# Patient Record
Sex: FEMALE | Race: AMERICAN INDIAN OR ALASKA NATIVE
[De-identification: names, ages, dates, MRNs, and addresses within clinical notes are randomized per-mention and may not be internally consistent; named-entity substitution may affect disease eponyms.]

---

## 2018-06-30 NOTE — EMERGENCY DEPARTMENT REPORT
ED Pregnancy HPI





- General


Chief complaint: Vaginal Bleeding


Stated complaint: 11 WKS PREG VAGINAL BLEEDING


Time Seen by Provider: 18 12:43


Source: patient


Mode of arrival: Ambulatory


Limitations: No Limitations





- History of Present Illness


Initial comments: 


27-year-old female  presents with complaint of 2 days of vaginal bleeding 

and crampy lower abdominal pain.  Denies discharge hematuria or dysuria denies 

any fevers or chills.  Patient is awake alert and oriented 3 not in acute 

distress accompanied by significant other at bedside.  Last menstrual period 

2018.


MD Complaint: vaginal bleeding





- Related Data


 Previous Rx's











 Medication  Instructions  Recorded  Last Taken  Type


 


Prednisone [Prednisone 10 mg 10 mg PO .TAPER #1 tab.ds.pk 03/21/15 Unknown Rx





(6-Day Pack, 21 Tabs)]    


 


traMADol [Ultram 50 MG tab] 50 mg PO Q6HR PRN #14 tablet 03/21/15 Unknown Rx


 


Ondansetron [Zofran Odt] 4 mg PO Q8HR PRN #8 tab.rapdis 05/19/15 Unknown Rx


 


Prenatal Vit-Fe Fumar-FA [Prenatal 1 each PO QDAY #30 tablet 05/19/15 Unknown Rx





Vitamin]    


 


Acetaminophen [Acetaminophen TAB] 500 mg PO Q6HR PRN #20 tablet 18 

Unknown Rx


 


Acetaminophen/Codeine [Tylenol 1 tab PO Q6H PRN #8 tab 18 Unknown Rx





/Codeine # 3 tab]    


 


Nitrofurantoin Monohyd/M-Cryst 100 mg PO BID #14 capsule 18 Unknown Rx





[Macrobid 100 mg Capsule]    











 Allergies











Allergy/AdvReac Type Severity Reaction Status Date / Time


 


Penicillins Allergy  Unknown Verified 03/21/15 18:14


 


aloe vera AdvReac  Hives Verified 05/19/15 11:34














ED Review of Systems


ROS: 


Stated complaint: 11 WKS PREG VAGINAL BLEEDING


Other details as noted in HPI





Constitutional: denies: chills, fever


Eyes: denies: eye pain, eye discharge, vision change


ENT: denies: ear pain, throat pain


Respiratory: denies: cough, shortness of breath, wheezing


Cardiovascular: denies: chest pain, palpitations


Endocrine: no symptoms reported


Gastrointestinal: denies: abdominal pain, nausea, diarrhea


Genitourinary: denies: urgency, dysuria, discharge


Musculoskeletal: denies: back pain, joint swelling, arthralgia


Skin: denies: rash, lesions


Neurological: denies: headache, weakness, paresthesias


Psychiatric: denies: anxiety, depression


Hematological/Lymphatic: denies: easy bleeding, easy bruising





ED Past Medical Hx





- Surgical History


Additional Surgical History: C section





- Social History


Smoking Status: Never Smoker


Substance Use Type: None





- Medications


Home Medications: 


 Home Medications











 Medication  Instructions  Recorded  Confirmed  Last Taken  Type


 


Prednisone [Prednisone 10 mg 10 mg PO .TAPER #1 tab.ds.pk 03/21/15  Unknown Rx





(6-Day Pack, 21 Tabs)]     


 


traMADol [Ultram 50 MG tab] 50 mg PO Q6HR PRN #14 tablet 03/21/15  Unknown Rx


 


Ondansetron [Zofran Odt] 4 mg PO Q8HR PRN #8 tab.rapdis 05/19/15  Unknown Rx


 


Prenatal Vit-Fe Fumar-FA [Prenatal 1 each PO QDAY #30 tablet 05/19/15  Unknown 

Rx





Vitamin]     


 


Acetaminophen [Acetaminophen TAB] 500 mg PO Q6HR PRN #20 tablet 18  

Unknown Rx


 


Acetaminophen/Codeine [Tylenol 1 tab PO Q6H PRN #8 tab 18  Unknown Rx





/Codeine # 3 tab]     


 


Nitrofurantoin Monohyd/M-Cryst 100 mg PO BID #14 capsule 18  Unknown Rx





[Macrobid 100 mg Capsule]     














ED Physical Exam





- General


Limitations: No Limitations


General appearance: alert, in no apparent distress





- Head


Head exam: Present: atraumatic, normocephalic





- Eye


Eye exam: Present: normal appearance





- ENT


ENT exam: Present: mucous membranes moist





- Neck


Neck exam: Present: normal inspection





- Respiratory


Respiratory exam: Present: normal lung sounds bilaterally.  Absent: respiratory 

distress





- Cardiovascular


Cardiovascular Exam: Present: regular rate, normal rhythm.  Absent: systolic 

murmur, diastolic murmur, rubs, gallop





- GI/Abdominal


GI/Abdominal exam: Present: soft, normal bowel sounds





- 


Speculum exam: Present: vaginal bleeding





- Extremities Exam


Extremities exam: Present: normal inspection





- Back Exam


Back exam: Present: normal inspection





- Neurological Exam


Neurological exam: Present: alert, oriented X3, CN II-XII intact, normal gait





- Psychiatric


Psychiatric exam: Present: normal affect, normal mood





- Skin


Skin exam: Present: warm, dry, intact, normal color.  Absent: rash





ED Course


 Vital Signs











  18





  11:03


 


Temperature 98.4 F


 


Pulse Rate 103 H


 


Blood Pressure 132/83


 


O2 Sat by Pulse 99





Oximetry 














ED Medical Decision Making





- Lab Data


Result diagrams: 


 18 12:02








- Medical Decision Making


A/P: miscarriage


1- ultrasound consistent with active miscarriage


2- discussed with Dr. James before discharge, patient states she has a OB/GYN 

called Dr. Lucita Metcalf at [facility]


3- RH+


4- I advised patient to follow up with her OB/GYN in 2-4 days for repeat hCG 

level and further workup as needed.  Urinalysis is unremarkable otherwise. 


Critical care attestation.: 


If time is entered above; I have spent that time in minutes in the direct care 

of this critically ill patient, excluding procedure time.








ED Disposition


Clinical Impression: 


 Miscarriage





Disposition: DC-01 TO HOME OR SELFCARE


Is pt being admited?: No


Does the pt Need Aspirin: No


Condition: Stable


Instructions:  Spontaneous Miscarriage (ED)


Additional Instructions: 


I advised patient to follow-up with her OB/GYN in 4-5 days for repeat blood 

testing hormone level and possible ultrasound


Prescriptions: 


Acetaminophen [Acetaminophen TAB] 500 mg PO Q6HR PRN #20 tablet


 PRN Reason: Pain , Severe (7-10)


Acetaminophen/Codeine [Tylenol /Codeine # 3 tab] 1 tab PO Q6H PRN #8 tab


 PRN Reason: Pain , Severe (7-10)


Nitrofurantoin Monohyd/M-Cryst [Macrobid 100 mg Capsule] 100 mg PO BID #14 

capsule


Referrals: 


BENJA MEDINA MD [Staff Physician] - 3-5 Days


Forms:  Accompanied Note, Work/School Release Form(ED)


Time of Disposition: 14:49

## 2018-06-30 NOTE — EMERGENCY DEPARTMENT REPORT
Blank Doc





- Documentation


Documentation: 


 27-year-old female  approximately 11 weeks pregnant by last menstrual 

period that presents with bilateral lower abdominal cramping and vaginal 

bleeding for the past 2 days.  Patient says that her bleeding is worsening.  

She has seen several clots passed.  She is concerned that she could be having a 

miscarriage.  Patient has not had an ultrasound to confirm her dates.  She has 

never had a miscarriage before.  Denies urinary symptoms.  Previous 4 

pregnancies were complicated.

## 2018-06-30 NOTE — ULTRASOUND REPORT
TRANSABDOMINAL AND TRANSVAGINAL OBSTETRICAL ULTRASOUND: 06/30/18 

10:48:00



CLINICAL: Positive pregnancy test and vaginal bleeding.







FINDINGS: Transabdominal and transvaginal pelvic ultrasound 

demonstrated a large amount of echogenic material which is presumably 

blood in the lower uterine cavity extending into the cervix and an 

empty gestational sac in the lower uterine segment.  No yolk sac or 

embryo identified.  The cervix is closed.  The right ovary is normal 

and was only identified transabdominally.  It measures 2.8 x 1.4 x 

2.7cm.  The left ovary measures 3.1 x 2.1 x 2.2 cm and contains a 

complex cyst measuring 1.3cm. 



No adnexal mass.  No free fluid. Normal urinary bladder.



IMPRESSION: An abnormal intrauterine gestational sac and its uterine 

blood consistent with an intrauterine AB in progress.  No suspicion of 

ectopic pregnancy.

## 2019-06-19 ENCOUNTER — HOSPITAL ENCOUNTER (EMERGENCY)
Dept: HOSPITAL 5 - ED | Age: 29
Discharge: HOME | End: 2019-06-19
Payer: MEDICAID

## 2019-06-19 VITALS — DIASTOLIC BLOOD PRESSURE: 70 MMHG | SYSTOLIC BLOOD PRESSURE: 119 MMHG

## 2019-06-19 DIAGNOSIS — R50.9: ICD-10-CM

## 2019-06-19 DIAGNOSIS — Z91.09: ICD-10-CM

## 2019-06-19 DIAGNOSIS — R19.7: ICD-10-CM

## 2019-06-19 DIAGNOSIS — O26.891: Primary | ICD-10-CM

## 2019-06-19 DIAGNOSIS — Z88.0: ICD-10-CM

## 2019-06-19 DIAGNOSIS — R51: ICD-10-CM

## 2019-06-19 PROCEDURE — 99281 EMR DPT VST MAYX REQ PHY/QHP: CPT

## 2019-06-19 NOTE — EMERGENCY DEPARTMENT REPORT
ED Fever HPI





- General


Chief Complaint: Fever


Stated Complaint: FLU SYMPTOMS


Time Seen by Provider: 19 09:25


Source: patient





- History of Present Illness


Initial Comments: 





Ms. Nelson is a 27 yo  who is approximately 6-7 weeks pregnant.  She took a

home pregnancy test which was positive.  She does not have any abdominal pain or

vomiting.  She does have headache diarrhea fever and chills for the past few 

days.  Fever at home was 10 4F.  She denies vaginal bleeding.  She feels better

now.  However she wanted to make sure that she was not a risk for miscarriage.


Timing/Duration: other (3 days)


Fever Severity/Quality: greater than 102 F





ED Review of Systems


ROS: 


Stated complaint: FLU SYMPTOMS


Other details as noted in HPI





Comment: All other systems reviewed and negative


Constitutional: chills, fever


Gastrointestinal: nausea


Neurological: headache





ED Past Medical Hx





- Past Medical History


Previous Medical History?: No





- Surgical History


Past Surgical History?: Yes


Additional Surgical History: C section





- Social History


Smoking Status: Never Smoker





- Medications


Home Medications: 


                                Home Medications











 Medication  Instructions  Recorded  Confirmed  Last Taken  Type


 


Prednisone [Prednisone 10 mg 10 mg PO .TAPER #1 tab.ds.pk 03/21/15  Unknown Rx





(6-Day Pack, 21 Tabs)]     


 


traMADol [Ultram 50 MG tab] 50 mg PO Q6HR PRN #14 tablet 03/21/15  Unknown Rx


 


Ondansetron [Zofran Odt] 4 mg PO Q8HR PRN #8 tab.rapdis 05/19/15  Unknown Rx


 


Prenatal Vit-Fe Fumar-FA [Prenatal 1 each PO QDAY #30 tablet 05/19/15  Unknown 

Rx





Vitamin]     


 


Acetaminophen [Acetaminophen TAB] 500 mg PO Q6HR PRN #20 tablet 18  

Unknown Rx


 


Acetaminophen/Codeine [Tylenol 1 tab PO Q6H PRN #8 tab 18  Unknown Rx





/Codeine # 3 tab]     


 


Nitrofurantoin Monohyd/M-Cryst 100 mg PO BID #14 capsule 18  Unknown Rx





[Macrobid 100 mg Capsule]     


 


Azithromycin [Zithromax Z-WALTER] 250 mg PO DAILY #6 tablet 19  Unknown Rx


 


Promethazine [Phenergan] 25 mg PO Q6HR PRN #10 tab 19  Unknown Rx














ED Physical Exam





- General


Limitations: No Limitations


General appearance: alert, in no apparent distress





- Head


Head exam: Present: atraumatic, normocephalic





- Eye


Eye exam: Present: normal appearance





- ENT


ENT exam: Present: mucous membranes moist, other (edematous tonsils without 

exudate)





- Neck


Neck exam: Present: normal inspection





- Respiratory


Respiratory exam: Present: normal lung sounds bilaterally.  Absent: respiratory 

distress, wheezes, rales, rhonchi





- Cardiovascular


Cardiovascular Exam: Present: regular rate, normal rhythm, normal heart sounds. 

 Absent: systolic murmur, diastolic murmur, rubs, gallop





- GI/Abdominal


GI/Abdominal exam: Present: soft, normal bowel sounds.  Absent: distended, 

tenderness, guarding, rebound





- Extremities Exam


Extremities exam: Present: normal inspection





- Back Exam


Back exam: Present: normal inspection





- Neurological Exam


Neurological exam: Present: alert, oriented X3





- Psychiatric


Psychiatric exam: Present: normal affect, normal mood





- Skin


Skin exam: Present: warm, dry, intact, normal color.  Absent: rash





ED Course





                                   Vital Signs











  19





  08:34


 


Temperature 98.5 F


 


Pulse Rate 89


 


Respiratory 18





Rate 


 


Blood Pressure 119/70


 


O2 Sat by Pulse 100





Oximetry 














ED Medical Decision Making





- Medical Decision Making





Ms. Nelson presents with fever headache diarrhea.  No indication of ectopic 

pregnancy.  No current abdominal pain or vaginal bleeding.





Prescribed azithromycin for possible pharyngitis.  Promethazine also prescribed 

for nausea


Critical care attestation.: 


If time is entered above; I have spent that time in minutes in the direct care 

of this critically ill patient, excluding procedure time.








ED Disposition


Clinical Impression: 


 Fever, First trimester pregnancy





Disposition: DC-01 TO HOME OR SELFCARE


Is pt being admited?: No


Does the pt Need Aspirin: No


Condition: Stable


Instructions:  Fever in Adults (ED)


Prescriptions: 


Promethazine [Phenergan] 25 mg PO Q6HR PRN #10 tab


 PRN Reason: Nausea


Azithromycin [Zithromax Z-WALTER] 250 mg PO DAILY #6 tablet